# Patient Record
Sex: FEMALE | Race: WHITE | URBAN - NONMETROPOLITAN AREA
[De-identification: names, ages, dates, MRNs, and addresses within clinical notes are randomized per-mention and may not be internally consistent; named-entity substitution may affect disease eponyms.]

---

## 2017-06-29 ENCOUNTER — OFFICE VISIT - GICH (OUTPATIENT)
Dept: PEDIATRICS | Facility: OTHER | Age: 62
End: 2017-06-29

## 2017-06-29 ENCOUNTER — HISTORY (OUTPATIENT)
Dept: PEDIATRICS | Facility: OTHER | Age: 62
End: 2017-06-29

## 2017-06-29 DIAGNOSIS — M79.7 FIBROMYALGIA: ICD-10-CM

## 2017-06-29 DIAGNOSIS — M54.50 LOW BACK PAIN: ICD-10-CM

## 2017-06-29 DIAGNOSIS — E66.01 MORBID (SEVERE) OBESITY DUE TO EXCESS CALORIES (H): ICD-10-CM

## 2017-06-29 DIAGNOSIS — G89.29 OTHER CHRONIC PAIN: ICD-10-CM

## 2017-06-29 DIAGNOSIS — Z96.21 COCHLEAR IMPLANT STATUS: ICD-10-CM

## 2017-06-29 DIAGNOSIS — G89.4 CHRONIC PAIN SYNDROME: ICD-10-CM

## 2017-06-29 DIAGNOSIS — M81.0 AGE-RELATED OSTEOPOROSIS WITHOUT CURRENT PATHOLOGICAL FRACTURE: ICD-10-CM

## 2017-06-29 DIAGNOSIS — K21.9 GASTRO-ESOPHAGEAL REFLUX DISEASE WITHOUT ESOPHAGITIS: ICD-10-CM

## 2017-06-30 ENCOUNTER — HISTORY (OUTPATIENT)
Dept: PEDIATRICS | Facility: OTHER | Age: 62
End: 2017-06-30

## 2017-07-17 ENCOUNTER — COMMUNICATION - GICH (OUTPATIENT)
Dept: INTERNAL MEDICINE | Facility: OTHER | Age: 62
End: 2017-07-17

## 2017-07-17 DIAGNOSIS — M79.7 FIBROMYALGIA: ICD-10-CM

## 2017-12-27 NOTE — PROGRESS NOTES
Patient Information     Patient Name MRN Sex Saba Osuna [id number] Female 1955      Progress Notes by Segun Stern MD at 2017  2:30 PM     Author:  Segun Stern MD Service:  (none) Author Type:  Physician     Filed:  2017  8:48 AM Encounter Date:  2017 Status:  Signed     :  Segun Stern MD (Physician)            Subjective  Saba Carroll is a 62 y.o. female who presents for [facility] primary care. Previous physician was in [facility] in the hospitals. She has a history of fibromyalgia, osteoporosis, osteoarthritis, chronic pain syndrome and continues on a buprenorphine Patch weekly, Celebrex, Cymbalta, Lyrica, Robaxin, Requip. History of gastroesophageal reflux disease which has been stable on Nexium. History of hypertension stable with carvedilol and hydrochlorothiazide. History of allergic rhinosinusitis stable with Allegra. History of deafness status post cochlear implants. She is unable to get an MRI due to her cochlear implants. She's not done physical therapy since about a year. She travels regularly around the country, lives in a RV. Her last pain doctor was David Todd in [place], [place]. She plans to spend part of the year and [place]. She is had a low back surgery in . She also has degenerative disc disease and degenerative joint disease.    Problem List/PMH: reviewed in EMR, and made relevant updates today.  Medications: reviewed in EMR, and made relevant updates today.  Allergies: reviewed in EMR, and made relevant updates today.    Social Hx:  Social History     Substance Use Topics       Smoking status: Never Smoker     Smokeless tobacco: Never Used     Alcohol use No     Social History Narrative    Travels around the country with her  in an RV      I reviewed social history and made relevant updates today.    Family Hx:   No family history on file.    Objective  Vitals: reviewed in EMR.  /76  Pulse 60  Ht 1.6  "m (5' 3\")  Wt 107.5 kg (237 lb)  BMI 41.98 kg/m2    Gen: Pleasant female, NAD.  HEENT: MMM, no OP erythema.   Neck: Supple, no JVD, no bruits.   CV: RRR no m/r/g.   Pulm: CTAB no w/r/r  GI: Soft, NT, ND.Bowel sounds present.  Neuro: Grossly intact  Msk: No lower extremity edema.  Skin: No concerning lesions.  Psychiatric: Normal affect and insight. Does not appear anxious or depressed.    Outside records reviewed, see scanned documents    Assessment    ICD-10-CM    1. Fibromyalgia M79.7 AMB CONSULT TO PAIN CLINIC      AMB CONSULT TO PHYSICAL THERAPY      pregabalin (LYRICA) 150 mg capsule      rOPINIRole (REQUIP) 1 mg tablet   2. Chronic low back pain, unspecified back pain laterality, with sciatica presence unspecified M54.5 AMB CONSULT TO PAIN CLINIC     G89.29 AMB CONSULT TO PHYSICAL THERAPY   3. Cochlear implant in place Z96.21    4. Morbid obesity due to excess calories (HC) E66.01    5. Osteoporosis, unspecified osteoporosis type, unspecified pathological fracture presence M81.0    6. Chronic pain syndrome G89.4    7. Gastroesophageal reflux disease, esophagitis presence not specified K21.9        We had a lengthy discussion today with regards to her pain syndromes and medication management thereof. I don't feel comfortable continuing her buprenorphine patch for multiple reasons including but this is a medication I do not prescribe, multiple locations of residence over the past 6-12 months, narcotics have been shown to be counterproductive making pain worse with fibromyalgia. I strongly encouraged her to establish with the pain clinic locally versus return to [place] to see Dr. Todd. [facility] database was reviewed today, see scanned documents. As the patient endorsed she has had several different providers provide narcotics including a physician in [place] and in [place] further complicating her desire to establish care with me. No narcotics were prescribed today.    Plan   -- Expected " clinical course discussed   -- Medications and their side effects discussed  Patient Instructions    -- Pain clinic consult for med management   -- Contact Dr. Lowe for refill prior   -- Try out Herbert Chi and/or Yoga   -- Daily exercise   -- PT consult   -- Follow-up after pain clinic consult    Return in about 6 weeks (around 8/10/2017), or if symptoms worsen or fail to improve.    Signed, Segun Stern MD  Internal Medicine & Pediatrics

## 2017-12-28 NOTE — TELEPHONE ENCOUNTER
Patient Information     Patient Name MRN Saba Paez [id number] Female 1955      Telephone Encounter by Jennie Freed RN at 2017 11:20 AM     Author:  Jennie Freed RN Service:  (none) Author Type:  NURS- Registered Nurse     Filed:  2017 11:29 AM Encounter Date:  2017 Status:  Signed     :  Jennie Freed RN (NURS- Registered Nurse)            Received fax from [email or web address] for refill on Lyrica.  Called patient to verify as last refills were sent to [facility] pharmacy.  pregabalin (LYRICA) 150 mg capsule    Qty:180 capsu*  Ref:2  Start:2017   End:              Route:Oral                  KAREY:No   Class:Print    Sig:Take 2 capsules by mouth 2 times daily    Patient states she had only 30 days filled at [facility] pharmacy as she would like the rest to be sent to [facility].     Called [facility] pharmacy and last refill for Lyrica was on 17 #120, 30 day supply.    Patient states she is switching PCP, Dr. Pittman, but was told to request her refills from provider who originally prescribed them as there was refills noted on original rX.    Unable to complete prescription refill per RN Medication Refill Policy.................... JENNIE FREED RN ....................  2017   11:28 AM

## 2017-12-29 NOTE — PATIENT INSTRUCTIONS
Patient Information     Patient Name MRN Saba Paez [id number] Female 1955      Patient Instructions by Segun Stern MD at 2017  2:30 PM     Author:  Segun Stern MD  Service:  (none) Author Type:  Physician     Filed:  2017  3:11 PM  Encounter Date:  2017 Status:  Addendum     :  Segun Stern MD (Physician)        Related Notes: Original Note by Segun Stern MD (Physician) filed at 2017  3:08 PM             -- Pain clinic consult for med management   -- Contact Dr. Lowe for refill prior   -- Try out Herbert Chi and/or Yoga   -- Daily exercise   -- PT consult   -- Follow-up after pain clinic consult

## 2017-12-30 NOTE — NURSING NOTE
Patient Information     Patient Name MRN Saba Paez [id number] Female 1955      Nursing Note by Meagan Merchant at 2017  2:30 PM     Author:  Meagan Merchant Service:  (none) Author Type:  (none)     Filed:  2017  2:53 PM Encounter Date:  2017 Status:  Signed     :  Meagan Merchant            Patient presents to clinic for medication mangement.  They spend the summers up here in [place], but they live in their  and travel.  Meagan Merchant LPN ....................  2017   2:40 PM

## 2018-01-27 VITALS
HEART RATE: 60 BPM | SYSTOLIC BLOOD PRESSURE: 110 MMHG | HEIGHT: 63 IN | DIASTOLIC BLOOD PRESSURE: 76 MMHG | WEIGHT: 237 LBS | BODY MASS INDEX: 41.99 KG/M2

## 2018-02-05 ENCOUNTER — DOCUMENTATION ONLY (OUTPATIENT)
Dept: FAMILY MEDICINE | Facility: OTHER | Age: 63
End: 2018-02-05

## 2018-02-05 PROBLEM — M54.50 CHRONIC LOW BACK PAIN: Status: ACTIVE | Noted: 2017-06-29

## 2018-02-05 PROBLEM — G89.4 CHRONIC PAIN DISORDER: Status: ACTIVE | Noted: 2017-06-29

## 2018-02-05 PROBLEM — Z96.21 COCHLEAR IMPLANT IN PLACE: Status: ACTIVE | Noted: 2017-06-29

## 2018-02-05 PROBLEM — M79.7 FIBROMYALGIA: Status: ACTIVE | Noted: 2017-06-29

## 2018-02-05 PROBLEM — K21.9 GASTROESOPHAGEAL REFLUX DISEASE: Status: ACTIVE | Noted: 2017-06-29

## 2018-02-05 PROBLEM — G89.29 CHRONIC LOW BACK PAIN: Status: ACTIVE | Noted: 2017-06-29

## 2018-02-05 PROBLEM — M81.0 OSTEOPOROSIS: Status: ACTIVE | Noted: 2017-06-29

## 2018-02-05 PROBLEM — E66.01 MORBID OBESITY DUE TO EXCESS CALORIES (H): Status: ACTIVE | Noted: 2017-06-29

## 2018-02-05 RX ORDER — DULOXETIN HYDROCHLORIDE 60 MG/1
60 CAPSULE, DELAYED RELEASE ORAL DAILY
COMMUNITY
Start: 2017-06-09

## 2018-02-05 RX ORDER — ROPINIROLE 1 MG/1
1 TABLET, FILM COATED ORAL DAILY
COMMUNITY
Start: 2017-06-29

## 2018-02-05 RX ORDER — METHOCARBAMOL 500 MG/1
500 TABLET, FILM COATED ORAL
COMMUNITY
Start: 2017-06-09

## 2018-02-05 RX ORDER — HYDROCHLOROTHIAZIDE 12.5 MG/1
12.5 CAPSULE ORAL DAILY
COMMUNITY

## 2018-02-05 RX ORDER — ESOMEPRAZOLE MAGNESIUM 40 MG/1
40 CAPSULE, DELAYED RELEASE ORAL DAILY
COMMUNITY
Start: 2017-06-09

## 2018-02-05 RX ORDER — BUPRENORPHINE 10 UG/H
10 PATCH TRANSDERMAL WEEKLY
COMMUNITY

## 2018-02-05 RX ORDER — CARVEDILOL PHOSPHATE 20 MG/1
20 CAPSULE, EXTENDED RELEASE ORAL DAILY
COMMUNITY
Start: 2017-06-23

## 2018-02-05 RX ORDER — PREGABALIN 150 MG/1
300 CAPSULE ORAL 2 TIMES DAILY
COMMUNITY
Start: 2017-07-17

## 2018-02-05 RX ORDER — FEXOFENADINE HCL 180 MG/1
180 TABLET ORAL DAILY
COMMUNITY
Start: 2017-06-09

## 2018-02-05 RX ORDER — CELECOXIB 200 MG/1
200 CAPSULE ORAL DAILY
COMMUNITY
Start: 2017-06-09